# Patient Record
Sex: FEMALE | Race: WHITE | NOT HISPANIC OR LATINO | Employment: OTHER | URBAN - METROPOLITAN AREA
[De-identification: names, ages, dates, MRNs, and addresses within clinical notes are randomized per-mention and may not be internally consistent; named-entity substitution may affect disease eponyms.]

---

## 2017-02-09 NOTE — PATIENT DISCUSSION
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 - Plan : Monitor for changes. Advised patient to call our office with decreased vision or increased distortion. Patient advised to check [employer] regularly (once weekly or more) and use nutraceuticals such as AREDS 2 eye vitamins.  Wear sunglasses when outdoors and eat green, leafy vegetables to maintain ocular health. 6 MONTH/ EXAM / MAC OCT

## 2017-08-18 NOTE — PATIENT DISCUSSION
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 - Plan : Monitor for changes. Advised patient to call our office with decreased vision or increased distortion. Patient advised to check [employer] regularly (once weekly or more) and use nutraceuticals such as AREDS 2 eye vitamins. Wear sunglasses when outdoors and eat green, leafy vegetables to maintain ocular health. Return to clinic in six months for Macular Fundus Photos and Exam. Optical coherence tomography performed.

## 2017-08-18 NOTE — PATIENT DISCUSSION
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 - Plan : Monitor for changes.

## 2018-02-16 NOTE — PATIENT DISCUSSION
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 Advised patient to call our office with decreased vision or increased distortion. Patient advised to check [employer] regularly (once weekly or more) and use nutraceuticals such as AREDS 2 eye vitamins. Wear sunglasses when outdoors and eat green, leafy vegetables to maintain ocular health. Return to clinic in six months for Macular Fundus Photos and Exam. Optical coherence tomography performed.

## 2018-02-16 NOTE — PATIENT DISCUSSION
(Q20.802) Vitreous degeneration, bilateral - Assesment : Examination revealed PVD - Plan : Monitor for changes. Advised patient to call our office with decreased vision or an increase in flashes and/or floaters.

## 2018-02-16 NOTE — PATIENT DISCUSSION
(H35.363) Drusen (degenerative) of macula, bilateral - Assesment : Examination revealed Drusen. OU: CENTRAL HARD SOFT DRUSEN IN MACULA  OU: DRUSEN @ [facility] - Plan : Monitor for changes. Advised patient to call our office with decreased vision or increased symptoms.

## 2019-05-28 NOTE — PATIENT DISCUSSION
(H01.001) Unspecified blepharitis right upper eyelid - Assesment : Examination revealed Blepharitis. - Plan : Warm soaks with massage to eyelid two times daily.  OCUSOFT FOAM 3-4 NIGHTS A WEEK

## 2019-05-28 NOTE — PATIENT DISCUSSION
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 Advised patient to call our office with decreased vision or increased distortion. Patient advised to check [employer] regularly (once weekly or more) and use nutraceuticals such as AREDS 2 eye vitamins.  Wear sunglasses when outdoors and eat green, leafy vegetables to maintain ocular health. 6 MONTH/ DILATION/ MAC OCT

## 2019-05-28 NOTE — PATIENT DISCUSSION
(R70.013) Vitreous degeneration, bilateral - Assesment : Examination revealed PVD - Plan : Monitor for changes. Advised patient to call our office with decreased vision or an increase in flashes and/or floaters.

## 2019-05-28 NOTE — PATIENT DISCUSSION
(H01.004) Unspecified blepharitis left upper eyelid - Assesment : Examination revealed Blepharitis.  - Plan : SEE PLAN 4

## 2021-12-06 ENCOUNTER — COMPREHENSIVE EXAM (OUTPATIENT)
Dept: URBAN - METROPOLITAN AREA CLINIC 10 | Facility: CLINIC | Age: 41
End: 2021-12-06

## 2021-12-06 DIAGNOSIS — H52.13: ICD-10-CM

## 2021-12-06 DIAGNOSIS — H35.61: ICD-10-CM

## 2021-12-06 PROCEDURE — 92014 COMPRE OPH EXAM EST PT 1/>: CPT

## 2021-12-06 PROCEDURE — 92250 FUNDUS PHOTOGRAPHY W/I&R: CPT

## 2021-12-06 PROCEDURE — 92015 DETERMINE REFRACTIVE STATE: CPT

## 2021-12-06 ASSESSMENT — VISUAL ACUITY
OS_SC: 20/80
OD_SC: 20/80
OU_SC: 20/80

## 2021-12-06 ASSESSMENT — TONOMETRY
OD_IOP_MMHG: 14
OS_IOP_MMHG: 14

## 2021-12-06 ASSESSMENT — KERATOMETRY
OS_AXISANGLE_DEGREES: 160
OD_AXISANGLE2_DEGREES: 100
OS_AXISANGLE2_DEGREES: 70
OD_K1POWER_DIOPTERS: 44.00
OS_K2POWER_DIOPTERS: 44.00
OS_K1POWER_DIOPTERS: 43.50
OD_K2POWER_DIOPTERS: 44.25
OD_AXISANGLE_DEGREES: 010

## 2022-01-27 NOTE — PATIENT DISCUSSION
MAC OCT performed today, stable OU . Degeneration is contributing to decreased vision. New spec RX given .

## 2022-03-15 ASSESSMENT — KERATOMETRY
OS_AXISANGLE_DEGREES: 160
OD_AXISANGLE_DEGREES: 010
OD_K1POWER_DIOPTERS: 44.00
OD_AXISANGLE2_DEGREES: 100
OS_K2POWER_DIOPTERS: 44.00
OD_K2POWER_DIOPTERS: 44.25
OS_K1POWER_DIOPTERS: 43.50
OS_AXISANGLE2_DEGREES: 70

## 2022-03-16 ENCOUNTER — PREPPED CHART (OUTPATIENT)
Dept: URBAN - METROPOLITAN AREA CLINIC 10 | Facility: CLINIC | Age: 42
End: 2022-03-16

## 2022-03-16 ENCOUNTER — ESTABLISHED PATIENT (OUTPATIENT)
Dept: URBAN - METROPOLITAN AREA CLINIC 10 | Facility: CLINIC | Age: 42
End: 2022-03-16

## 2022-03-16 DIAGNOSIS — H35.62: ICD-10-CM

## 2022-03-16 PROCEDURE — 92012 INTRM OPH EXAM EST PATIENT: CPT

## 2022-03-16 PROCEDURE — 92250 FUNDUS PHOTOGRAPHY W/I&R: CPT

## 2022-03-16 ASSESSMENT — KERATOMETRY
OD_K2POWER_DIOPTERS: 44.25
OD_AXISANGLE2_DEGREES: 100
OS_K1POWER_DIOPTERS: 43.50
OD_K1POWER_DIOPTERS: 44.00
OS_K2POWER_DIOPTERS: 44.00
OS_AXISANGLE_DEGREES: 160
OS_AXISANGLE2_DEGREES: 70
OD_AXISANGLE_DEGREES: 010

## 2024-09-25 ENCOUNTER — COMPREHENSIVE EXAM (OUTPATIENT)
Dept: URBAN - METROPOLITAN AREA CLINIC 10 | Facility: CLINIC | Age: 44
End: 2024-09-25

## 2024-09-25 DIAGNOSIS — H52.13: ICD-10-CM

## 2024-09-25 PROCEDURE — 92014 COMPRE OPH EXAM EST PT 1/>: CPT

## 2024-09-25 PROCEDURE — 92015 DETERMINE REFRACTIVE STATE: CPT
